# Patient Record
Sex: FEMALE | Race: WHITE | NOT HISPANIC OR LATINO | URBAN - METROPOLITAN AREA
[De-identification: names, ages, dates, MRNs, and addresses within clinical notes are randomized per-mention and may not be internally consistent; named-entity substitution may affect disease eponyms.]

---

## 2021-10-26 ENCOUNTER — HISTORICAL (OUTPATIENT)
Dept: ADMINISTRATIVE | Facility: HOSPITAL | Age: 51
End: 2021-10-26

## 2021-10-26 LAB
ABS NEUT (OLG): 4.22 X10(3)/MCL (ref 2.1–9.2)
ALBUMIN SERPL-MCNC: 3.9 GM/DL (ref 3.5–5)
ALBUMIN/GLOB SERPL: 0.8 RATIO (ref 1.1–2)
ALP SERPL-CCNC: 87 UNIT/L (ref 40–150)
ALT SERPL-CCNC: 33 UNIT/L (ref 0–55)
APPEARANCE, UA: ABNORMAL
AST SERPL-CCNC: 30 UNIT/L (ref 5–34)
BACTERIA #/AREA URNS AUTO: ABNORMAL /HPF
BASOPHILS # BLD AUTO: 0 X10(3)/MCL (ref 0–0.2)
BASOPHILS NFR BLD AUTO: 1 %
BILIRUB SERPL-MCNC: 0.7 MG/DL
BILIRUB UR QL STRIP: NEGATIVE
BILIRUBIN DIRECT+TOT PNL SERPL-MCNC: 0.2 MG/DL (ref 0–0.5)
BILIRUBIN DIRECT+TOT PNL SERPL-MCNC: 0.5 MG/DL (ref 0–0.8)
BUN SERPL-MCNC: 13.3 MG/DL (ref 9.8–20.1)
CALCIUM SERPL-MCNC: 10 MG/DL (ref 8.7–10.5)
CHLORIDE SERPL-SCNC: 106 MMOL/L (ref 98–107)
CHOLEST SERPL-MCNC: 180 MG/DL
CHOLEST/HDLC SERPL: 6 {RATIO} (ref 0–5)
CO2 SERPL-SCNC: 25 MMOL/L (ref 22–29)
COLOR UR: YELLOW
CREAT SERPL-MCNC: 0.74 MG/DL (ref 0.55–1.02)
CREAT UR-MCNC: 193.5 MG/DL (ref 45–106)
DEPRECATED CALCIDIOL+CALCIFEROL SERPL-MC: 22.6 NG/ML (ref 30–80)
EOSINOPHIL # BLD AUTO: 0.1 X10(3)/MCL (ref 0–0.9)
EOSINOPHIL NFR BLD AUTO: 1 %
ERYTHROCYTE [DISTWIDTH] IN BLOOD BY AUTOMATED COUNT: 12.3 % (ref 11.5–14.5)
EST CREAT CLEARANCE SER (OHS): 78.99 ML/MIN
EST. AVERAGE GLUCOSE BLD GHB EST-MCNC: 214.5 MG/DL
GLOBULIN SER-MCNC: 4.6 GM/DL (ref 2.4–3.5)
GLUCOSE (UA): 150 MG/DL
GLUCOSE SERPL-MCNC: 196 MG/DL (ref 74–100)
HAV IGM SERPL QL IA: NONREACTIVE
HBA1C MFR BLD: 9.1 %
HBV CORE IGM SERPL QL IA: NONREACTIVE
HBV SURFACE AG SERPL QL IA: NONREACTIVE
HCT VFR BLD AUTO: 43.1 % (ref 35–46)
HCV AB SERPL QL IA: NONREACTIVE
HDLC SERPL-MCNC: 31 MG/DL (ref 35–60)
HGB BLD-MCNC: 14.9 GM/DL (ref 12–16)
HGB UR QL STRIP: NEGATIVE
HIV 1+2 AB+HIV1 P24 AG SERPL QL IA: NONREACTIVE
HYALINE CASTS #/AREA URNS LPF: ABNORMAL /LPF
IMM GRANULOCYTES # BLD AUTO: 0.02 10*3/UL
IMM GRANULOCYTES NFR BLD AUTO: 0 %
KETONES UR QL STRIP: 10 MG/DL
LDLC SERPL CALC-MCNC: 97 MG/DL (ref 50–140)
LEUKOCYTE ESTERASE UR QL STRIP: 250 LEU/UL
LYMPHOCYTES # BLD AUTO: 2 X10(3)/MCL (ref 0.6–4.6)
LYMPHOCYTES NFR BLD AUTO: 29 %
MCH RBC QN AUTO: 30.3 PG (ref 26–34)
MCHC RBC AUTO-ENTMCNC: 34.6 GM/DL (ref 31–37)
MCV RBC AUTO: 87.8 FL (ref 80–100)
MICROALBUMIN UR-MCNC: 10.5 MG/L
MICROALBUMIN/CREAT RATIO PNL UR: 5.4 MG/GM CR (ref 0–30)
MONOCYTES # BLD AUTO: 0.4 X10(3)/MCL (ref 0.1–1.3)
MONOCYTES NFR BLD AUTO: 6 %
NEUTROPHILS # BLD AUTO: 4.22 X10(3)/MCL (ref 2.1–9.2)
NEUTROPHILS NFR BLD AUTO: 62 %
NITRITE UR QL STRIP: NEGATIVE
NRBC BLD AUTO-RTO: 0 % (ref 0–0.2)
PH UR STRIP: 6 [PH] (ref 4.5–8)
PLATELET # BLD AUTO: 232 X10(3)/MCL (ref 130–400)
PMV BLD AUTO: 10.1 FL (ref 7.4–10.4)
POTASSIUM SERPL-SCNC: 4.2 MMOL/L (ref 3.5–5.1)
PROT SERPL-MCNC: 8.5 GM/DL (ref 6.4–8.3)
PROT UR QL STRIP: 20 MG/DL
RBC # BLD AUTO: 4.91 X10(6)/MCL (ref 4–5.2)
RBC #/AREA URNS AUTO: ABNORMAL /HPF
SODIUM SERPL-SCNC: 137 MMOL/L (ref 136–145)
SP GR UR STRIP: 1.03 (ref 1–1.03)
SQUAMOUS #/AREA URNS LPF: >100 /LPF
T PALLIDUM AB SER QL: NONREACTIVE
T4 FREE SERPL-MCNC: 0.89 NG/DL (ref 0.7–1.48)
TRIGL SERPL-MCNC: 262 MG/DL (ref 37–140)
TSH SERPL-ACNC: 2.43 UIU/ML (ref 0.35–4.94)
UROBILINOGEN UR STRIP-ACNC: NORMAL
VLDLC SERPL CALC-MCNC: 52 MG/DL
WBC # SPEC AUTO: 6.8 X10(3)/MCL (ref 4.5–11)
WBC #/AREA URNS AUTO: ABNORMAL /HPF

## 2021-12-14 ENCOUNTER — HISTORICAL (OUTPATIENT)
Dept: INTERNAL MEDICINE | Facility: CLINIC | Age: 51
End: 2021-12-14

## 2021-12-14 LAB
APPEARANCE, UA: CLEAR
BACTERIA #/AREA URNS AUTO: ABNORMAL /HPF
BILIRUB UR QL STRIP: NEGATIVE
BUN SERPL-MCNC: 11.2 MG/DL (ref 9.8–20.1)
CALCIUM SERPL-MCNC: 9.9 MG/DL (ref 8.7–10.5)
CHLORIDE SERPL-SCNC: 104 MMOL/L (ref 98–107)
CO2 SERPL-SCNC: 26 MMOL/L (ref 22–29)
COLOR UR: YELLOW
CREAT SERPL-MCNC: 0.99 MG/DL (ref 0.55–1.02)
CREAT UR-MCNC: 164.6 MG/DL (ref 45–106)
CREAT/UREA NIT SERPL: 11
EST. AVERAGE GLUCOSE BLD GHB EST-MCNC: 194.4 MG/DL
GLUCOSE (UA): 1000 MG/DL
GLUCOSE SERPL-MCNC: 191 MG/DL (ref 74–100)
HBA1C MFR BLD: 8.4 %
HGB UR QL STRIP: NEGATIVE
HYALINE CASTS #/AREA URNS LPF: ABNORMAL /LPF
KETONES UR QL STRIP: NEGATIVE
LEUKOCYTE ESTERASE UR QL STRIP: NEGATIVE
MICROALBUMIN UR-MCNC: 7.8 MG/L
MICROALBUMIN/CREAT RATIO PNL UR: 4.7 MG/GM CR (ref 0–30)
NITRITE UR QL STRIP: NEGATIVE
PH UR STRIP: 5.5 [PH] (ref 4.5–8)
POTASSIUM SERPL-SCNC: 4.5 MMOL/L (ref 3.5–5.1)
PROT UR QL STRIP: NEGATIVE
RBC #/AREA URNS AUTO: ABNORMAL /HPF
SODIUM SERPL-SCNC: 137 MMOL/L (ref 136–145)
SP GR UR STRIP: 1.02 (ref 1–1.03)
SQUAMOUS #/AREA URNS LPF: ABNORMAL /LPF
UROBILINOGEN UR STRIP-ACNC: NORMAL
WBC #/AREA URNS AUTO: ABNORMAL /HPF

## 2022-04-10 ENCOUNTER — HISTORICAL (OUTPATIENT)
Dept: ADMINISTRATIVE | Facility: HOSPITAL | Age: 52
End: 2022-04-10

## 2022-04-26 VITALS
SYSTOLIC BLOOD PRESSURE: 135 MMHG | WEIGHT: 246.94 LBS | BODY MASS INDEX: 41.14 KG/M2 | HEIGHT: 65 IN | DIASTOLIC BLOOD PRESSURE: 76 MMHG

## 2022-05-03 NOTE — HISTORICAL [facility] CERNER
This is a historical note converted from Gut[employer]. Formatting and pictures may have been removed.  Please reference Gutierrez for original formatting and attached multimedia. Chief Complaint  establish care  History of Present Illness  51 yo female here today to establish care. FH T2DM;  ?  Cervical Cancer Screening?-?$$  Breast Cancer?Screening -?$$  Colon Cancer Screening -?$$  Osteoporosis Screening?-?10/26/21 Vitamin D Level  Diabetic Screening -?  STI Screening -?10/26/21  ?  Todays Visit:  Pt reports today she was dx with T2DM about 1 year ago in the ED, chart review shows only a UA was completed, no blood work. Pt was suppose to f/u sooner but missed OVs. She reports she has lost weight and has been monitoring her diet. Pt reports no previous hx of T2DM, she is agreeable to labs today to evaluate levels. Will call to make an appt following lab results pending A1C and glucose levels. Pt will hold off on wellness screenings until she gets insurance. Denies any other acute issues today.  Denies chest pain, shortness of breath,?cough, fever, headache,?dizziness, weakness, abdominal pain, nausea,?vomiting, diarrhea, constipation, black/bloody stools, unplanned weight loss, night sweats, changes in urinary patterns, burning/odor with urination, depression, anxiety, and SI/HI.?  Review of Systems  Constitutional:?no fever, fatigue, weakness  Eye:?no vision loss, eye redness, drainage, or pain  ENMT:?no sore throat, ear pain, sinus pain/congestion, nasal congestion/drainage  Respiratory:?no cough, no wheezing, no shortness of breath  Cardiovascular:?no chest pain, no palpitations, no edema  Gastrointestinal:?no nausea, vomiting, or diarrhea. No abdominal pain  Genitourinary:?no dysuria, no urinary frequency or urgency, no hematuria  Hema/Lymph:?no abnormal bruising or bleeding  Endocrine:?no heat or cold intolerance, no excessive thirst or excessive urination  Musculoskeletal:?no muscle or joint pain, no joint  swelling  Integumentary:?no skin rash or abnormal lesion  Neurologic: no headache, no dizziness, no weakness or numbness  Physical Exam  Vitals & Measurements  T:?36.9? ?C (Oral)? HR:?79(Peripheral)? RR:?16? BP:?131/83?  HT:?165.00?cm? WT:?113.000?kg? BMI:?41.51?  General:?well-developed well-nourished in no acute distress  Eye: PERRLA, EOMI, clear conjunctiva, eyelids normal  HENT:?TMs/ear canals clear, oropharynx without erythema/exudate, oropharynx and nasal mucosal surfaces moist, no maxillary/frontal sinus tenderness to palpation  Neck: full range of motion, no thyromegaly or lymphadenopathy  Respiratory:?clear to auscultation bilaterally  Cardiovascular:?regular rate and rhythm without murmurs, gallops or rubs  Gastrointestinal:?soft, non-tender, non-distended with normal bowel sounds, without masses to palpation  Genitourinary: no CVA tenderness to palpation  Musculoskeletal:?full range of motion of all extremities/spine without limitation or discomfort  Integumentary: no rashes or skin lesions present  Neurologic: cranial nerves intact, no signs of peripheral neurological deficit, motor/sensory function intact  Assessment/Plan  1.?Wellness examination?Z00.00  Wellness Exam + Labs  Ordered:  1160F- Medication reconciliation completed during visit, Wellness examination  BMI 40.0-44.9, adult, [facility], 10/26/21 9:19:00 CDT  CBC w/ Auto Diff, Routine collect, *Est. 10/26/21 3:00:00 CDT, Blood, Order for future visit, *Est. Stop date 10/26/21 3:00:00 CDT, Lab Collect, Wellness examination, 10/26/21 8:55:00 CDT  Comprehensive Metabolic Panel, Routine collect, *Est. 10/26/21 3:00:00 CDT, Blood, Order for future visit, *Est. Stop date 10/26/21 3:00:00 CDT, Lab Collect, Wellness examination, 10/26/21 8:55:00 CDT  Free T4, Routine collect, *Est. 10/26/21 3:00:00 CDT, Blood, Order for future visit, *Est. Stop date 10/26/21 3:00:00 CDT, Lab Collect, Wellness examination, 10/26/21 8:55:00 CDT  Hepatitis  Panel-, Routine collect, *Est. 10/26/21 3:00:00 CDT, Blood, Order for future visit, *Est. Stop date 10/26/21 3:00:00 CDT, Lab Collect, Wellness examination, 10/26/21 8:55:00 CDT  HIV 1 and 2, Routine collect, *Est. 10/26/21 3:00:00 CDT, Blood, Order for future visit, *Est. Stop date 10/26/21 3:00:00 CDT, Lab Collect, Wellness examination, 10/26/21 8:55:00 CDT  Lipid Panel, Routine collect, *Est. 10/26/21 3:00:00 CDT, Blood, Order for future visit, *Est. Stop date 10/26/21 3:00:00 CDT, Lab Collect, Wellness examination, 10/26/21 8:55:00 CDT  Office/Outpatient Visit Level 4 Established [id number] PC, Wellness examination  BMI 40.0-44.9, adult, [facility], 10/26/21 9:19:00 CDT  Syphilis Antibody (with Reflex RPR), Routine collect, *Est. 10/26/21 3:00:00 CDT, Blood, Order for future visit, *Est. Stop date 10/26/21 3:00:00 CDT, Lab Collect, Wellness examination, 10/26/21 8:55:00 CDT  Thyroid Stimulating Hormone, Routine collect, *Est. 10/26/21 3:00:00 CDT, Blood, Order for future visit, *Est. Stop date 10/26/21 3:00:00 CDT, Lab Collect, Wellness examination, 10/26/21 8:55:00 CDT  Urinalysis with Microscopic if Indicated, Routine collect, Urine, Order for future visit, *Est. 10/26/21 3:00:00 CDT, *Est. Stop date 10/26/21 3:00:00 CDT, Nurse collect, Wellness examination  Vitamin D, 25-Hydroxy Level, Routine collect, *Est. 10/26/21 3:00:00 CDT, Blood, Order for future visit, *Est. Stop date 10/26/21 3:00:00 CDT, Lab Collect, Wellness examination, 10/26/21 8:55:00 CDT  ?  2.?BMI 40.0-44.9, adult?Z68.41  goal BMI <30.  Exercise 5 times a week for 30 minutes per day.  Avoid soda, simple sugars, excessive rice, potatoes or bread. Limit fast foods and fried foods.  Choose complex carbs in moderation (example: green vegetables, beans, oatmeal). Eat plenty of fresh fruits and vegetables with lean meats daily.  Do not skip meals. Eat a balanced portion size.  Avoid fad diets. Consider permanent healthy life style  changes.  Ordered:  1160F- Medication reconciliation completed during visit, Wellness examination  BMI 40.0-44.9, adult, [facility], 10/26/21 9:19:00 CDT  Office/Outpatient Visit Level 4 Established [id number] , Wellness examination  BMI 40.0-44.9, adult, [facility], 10/26/21 9:19:00 CDT  ?  Orders:  Chlam trachom & N gonorrhoeae by [facility] [id number], Routine collect, Urine, Order for future visit, *Est. 10/26/21 3:00:00 CDT, *Est. Stop date 10/26/21 3:00:00 CDT, Nurse collect, Type 2 diabetes mellitus, 10/26/21 8:55:00 CDT  Hemoglobin A1C [facility], Routine collect, *Est. 10/26/21 3:00:00 CDT, Blood, Order for future visit, *Est. Stop date 10/26/21 3:00:00 CDT, Lab Collect, Type 2 diabetes mellitus, 10/26/21 8:55:00 CDT  Microalbum/Creatinine Ratio Urine (Microalb/Creat), Routine collect, Urine, Order for future visit, *Est. 10/26/21 3:00:00 CDT, *Est. Stop date 10/26/21 3:00:00 CDT, Nurse collect, Type 2 diabetes mellitus  Referrals  Medications reviewed & reconciled during visit  Labs reviewed, verbalized understanding  Labs 1 week prior to next appointment  COVID 19 Precautions discussed  ?   Discussed with patient health goals related to [facility] [place]?- Restore, Maintain,?&?Improve Health  Patient Goal this visit:?Improve  ?   Problem List/Past Medical History  Ongoing  Diabetes mellitus  Morbid obesity  Historical  No qualifying data  Procedure/Surgical History  Drainage of Chest Skin, External Approach (01/18/2020)  Incision and drainage of abscess (eg, carbuncle, suppurative hidradenitis, cutaneous or subcutaneous abscess, cyst, furuncle, or paronychia); complicated or multiple (01/18/2020)  Drainage of Chest Skin, External Approach (02/11/2018)  Incision and drainage of abscess (eg, carbuncle, suppurative hidradenitis, cutaneous or subcutaneous abscess, cyst, furuncle, or paronychia); complicated or multiple (02/11/2018)  Foot  Hysterectomy  tubal   Medications  No active  medications  Allergies  No Known Allergies  Social History  Abuse/Neglect  No, No, Yes, 10/26/2021  Alcohol  Never, 10/26/2021  Substance Use  Never, 03/13/2017  Tobacco  Never (less than 100 in lifetime), No, 10/26/2021  Family History  DM (diabetes mellitus): Mother and Father.  Health Maintenance  Health Maintenance  ???Pending?(in the next year)  ??? ??OverDue  ??? ? ? ?Diabetes Maintenance-Serum Creatinine due??04/17/19??and every 1??year(s)  ??? ??Due?  ??? ? ? ?Diabetes Maintenance-Fasting Lipid Profile due??10/26/21??Variable frequency  ??? ? ? ?Diabetes Maintenance-Medication Prescribed due??10/26/21??and every 1??year(s)  ??? ? ? ?Tetanus Vaccine due??10/26/21??and every 10??year(s)  ??? ??Due In Future?  ??? ? ? ?Obesity Screening not due until??01/01/22??and every 1??year(s)  ??? ? ? ?Alcohol Misuse Screening not due until??01/02/22??and every 1??year(s)  ???Satisfied?(in the past 1 year)  ??? ??Satisfied?  ??? ? ? ?ADL Screening on??10/26/21.??Satisfied by Patrizia Salomon LPN  ??? ? ? ?Alcohol Misuse Screening on??10/26/21.??Satisfied by Ellyn Castillo  ??? ? ? ?Blood Pressure Screening on??10/26/21.??Satisfied by Patrizia Salomon LPN  ??? ? ? ?Body Mass Index Check on??10/26/21.??Satisfied by Patrizia Salomon LPN  ??? ? ? ?Depression Screening on??10/26/21.??Satisfied by Patrizia Salomon LPN  ??? ? ? ?Influenza Vaccine on??10/26/21.??Satisfied by Patrizia Salomon LPN  ??? ? ? ?Obesity Screening on??10/26/21.??Satisfied by Patrizia Salomon LPN  ?